# Patient Record
Sex: FEMALE | Race: WHITE | NOT HISPANIC OR LATINO | Employment: UNEMPLOYED | URBAN - METROPOLITAN AREA
[De-identification: names, ages, dates, MRNs, and addresses within clinical notes are randomized per-mention and may not be internally consistent; named-entity substitution may affect disease eponyms.]

---

## 2017-04-05 ENCOUNTER — OFFICE VISIT - HEALTHEAST (OUTPATIENT)
Dept: INTERNAL MEDICINE | Facility: CLINIC | Age: 22
End: 2017-04-05

## 2017-04-05 DIAGNOSIS — Z30.9 CONTRACEPTION MANAGEMENT: ICD-10-CM

## 2017-04-05 DIAGNOSIS — Z11.3 ROUTINE SCREENING FOR STI (SEXUALLY TRANSMITTED INFECTION): ICD-10-CM

## 2017-04-05 DIAGNOSIS — L70.9 ACNE: ICD-10-CM

## 2018-05-31 ENCOUNTER — COMMUNICATION - HEALTHEAST (OUTPATIENT)
Dept: INTERNAL MEDICINE | Facility: CLINIC | Age: 23
End: 2018-05-31

## 2019-08-01 ENCOUNTER — AMBULATORY - HEALTHEAST (OUTPATIENT)
Dept: MULTI SPECIALTY CLINIC | Facility: CLINIC | Age: 24
End: 2019-08-01

## 2019-08-01 LAB — PAP SMEAR - HIM PATIENT REPORTED: NORMAL

## 2019-08-21 ENCOUNTER — OFFICE VISIT - HEALTHEAST (OUTPATIENT)
Dept: FAMILY MEDICINE | Facility: CLINIC | Age: 24
End: 2019-08-21

## 2019-08-21 DIAGNOSIS — Z71.84 TRAVEL ADVICE ENCOUNTER: ICD-10-CM

## 2019-08-21 DIAGNOSIS — A09 TRAVELER'S DIARRHEA: ICD-10-CM

## 2020-01-16 ENCOUNTER — AMBULATORY - HEALTHEAST (OUTPATIENT)
Dept: INTERNAL MEDICINE | Facility: CLINIC | Age: 25
End: 2020-01-16

## 2020-11-24 ENCOUNTER — COMMUNICATION - HEALTHEAST (OUTPATIENT)
Dept: INTERNAL MEDICINE | Facility: CLINIC | Age: 25
End: 2020-11-24

## 2020-11-25 ENCOUNTER — OFFICE VISIT - HEALTHEAST (OUTPATIENT)
Dept: INTERNAL MEDICINE | Facility: CLINIC | Age: 25
End: 2020-11-25

## 2020-11-25 DIAGNOSIS — Z30.09 GENERAL COUNSELING FOR PRESCRIPTION OF ORAL CONTRACEPTIVES: ICD-10-CM

## 2020-11-25 DIAGNOSIS — F41.1 GAD (GENERALIZED ANXIETY DISORDER): ICD-10-CM

## 2020-11-25 ASSESSMENT — ANXIETY QUESTIONNAIRES
4. TROUBLE RELAXING: MORE THAN HALF THE DAYS
5. BEING SO RESTLESS THAT IT IS HARD TO SIT STILL: SEVERAL DAYS
2. NOT BEING ABLE TO STOP OR CONTROL WORRYING: SEVERAL DAYS
IF YOU CHECKED OFF ANY PROBLEMS ON THIS QUESTIONNAIRE, HOW DIFFICULT HAVE THESE PROBLEMS MADE IT FOR YOU TO DO YOUR WORK, TAKE CARE OF THINGS AT HOME, OR GET ALONG WITH OTHER PEOPLE: VERY DIFFICULT
GAD7 TOTAL SCORE: 10
6. BECOMING EASILY ANNOYED OR IRRITABLE: MORE THAN HALF THE DAYS
3. WORRYING TOO MUCH ABOUT DIFFERENT THINGS: SEVERAL DAYS
7. FEELING AFRAID AS IF SOMETHING AWFUL MIGHT HAPPEN: SEVERAL DAYS
1. FEELING NERVOUS, ANXIOUS, OR ON EDGE: MORE THAN HALF THE DAYS

## 2020-11-25 ASSESSMENT — PATIENT HEALTH QUESTIONNAIRE - PHQ9: SUM OF ALL RESPONSES TO PHQ QUESTIONS 1-9: 13

## 2021-05-26 ASSESSMENT — PATIENT HEALTH QUESTIONNAIRE - PHQ9: SUM OF ALL RESPONSES TO PHQ QUESTIONS 1-9: 13

## 2021-05-27 ENCOUNTER — COMMUNICATION - HEALTHEAST (OUTPATIENT)
Dept: INTERNAL MEDICINE | Facility: CLINIC | Age: 26
End: 2021-05-27

## 2021-05-27 DIAGNOSIS — F41.1 GAD (GENERALIZED ANXIETY DISORDER): ICD-10-CM

## 2021-05-28 ASSESSMENT — ANXIETY QUESTIONNAIRES: GAD7 TOTAL SCORE: 10

## 2021-05-30 VITALS — WEIGHT: 144.6 LBS | BODY MASS INDEX: 21.83 KG/M2

## 2021-05-31 NOTE — PROGRESS NOTES
HPI:  Monica Arauz is a 23 y.o. 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  She will be staying in [place].  Review of CDC requirements for vaccinations shows that she needs a typhoid vaccine.  She also could do a Turkish encephalitis vaccine but will need to go to the [facility] for that.  Also she will be in [place] most of the time but is planning a 2 or 3 weekend trips to more [place].  She will not be doing any cave exploring or going into any deep jungle type areas.  She will not be exposed to any wild animals.  Patient denies chest pain, palpitations, shortness of breath, wheezing, cough, fever, dysuria, rash, abdominal pain, nausea, vomiting, diarrhea, constipation.  No results found for: HGBA1C  No results found for: GLUF, MICROALBUR, LDLCALC, CREATININE  There is no problem list on file for this patient.    Family History   Problem Relation Age of Onset     Breast cancer Paternal Grandmother      Pancreatic cancer Paternal Grandfather      Social History     Socioeconomic History     Marital status: Single     Spouse name: Not on file     Number of children: Not on file     Years of education: Not on file     Highest education level: Not on file   Occupational History     Occupation: student   Social Needs     Financial resource strain: Not on file     Food insecurity:     Worry: Not on file     Inability: Not on file     Transportation needs:     Medical: Not on file     Non-medical: Not on file   Tobacco Use     Smoking status: Never Smoker   Substance and Sexual Activity     Alcohol use: Yes     Alcohol/week: 3.0 oz     Types: 5 Standard drinks or equivalent per week     Drug use: No     Sexual activity: Not Currently     Partners: Male     Birth control/protection: Pill   Lifestyle     Physical activity:     Days per week: Not on file     Minutes  per session: Not on file     Stress: Not on file   Relationships     Social connections:     Talks on phone: Not on file     Gets together: Not on file     Attends Lutheran service: Not on file     Active member of club or organization: Not on file     Attends meetings of clubs or organizations: Not on file     Relationship status: Not on file     Intimate partner violence:     Fear of current or ex partner: Not on file     Emotionally abused: Not on file     Physically abused: Not on file     Forced sexual activity: Not on file   Other Topics Concern     Not on file   Social History Narrative     Not on file     Past Surgical History:   Procedure Laterality Date     WISDOM TOOTH EXTRACTION       Current Outpatient Medications on File Prior to Visit   Medication Sig Dispense Refill     cholecalciferol, vitamin D3, 2,000 unit Tab Take 3 tablets by mouth.       norgestimate-ethinyl estradiol (TRINESSA, 28,) 0.18/0.215/0.25 mg-35 mcg (28) Tab tablet Take 1 tablet by mouth daily. 84 tablet 4     tretinoin (RETIN-A) 0.025 % cream Apply topically at bedtime. 45 g 3     No current facility-administered medications on file prior to visit.      No Known Allergies  OB History        0    Para   0    Term   0       0    AB   0    Living   0       SAB   0    TAB   0    Ectopic   0    Multiple   0    Live Births                   I have reviewed the patient's medical history in detail and updated the computerized patient record.  OBJECTIVE:  Wt Readings from Last 3 Encounters:   19 149 lb 1.6 oz (67.6 kg)   17 144 lb 9.6 oz (65.6 kg)   16 137 lb (62.1 kg)     Temp Readings from Last 3 Encounters:   No data found for Temp     BP Readings from Last 3 Encounters:   19 114/70   17 100/74   16 110/62     Pulse Readings from Last 3 Encounters:   19 70   17 80   16 74     Body mass index is 22.5 kg/m .     Alert, cooperative, well-hydrated. Appears well.  Eyes: Pupils  equal, round, reactive to light.  HEENT: Sclera white, nares patent, MMM, TM's pearly bilaterally  Neck: supple, without lymphadenopathy, Thyroid freely movable and without hypotrophy or nodularity.   Lungs: Clear to auscultation. No retractions, no increased work of respiration, equal chest rise.   Heart: Regular rate and rhythm, no murmurs, clicks,   Gallops.  Abdomen: Soft, bowel sounds in 4 quadrants with no tenderness to palpation, no organomegaly or masses, no aortic or renal bruits.  Extremities: no tenderness to palpation of gastrocnemius, bilaterally.  Skin: no increased warmth, edema, or erythema of lower legs bilaterally.    Labs:  No visits with results within 3 Month(s) from this visit.   Latest known visit with results is:   Office Visit on 04/05/2017   Component Date Value     Chlamydia trachomatis, A* 04/05/2017 Negative      Neisseria gonorrhoeae, A* 04/05/2017 Negative      ASSESMENT/PLAN:  1. Travel advice encounter  Typhoid, Inactive, Inj   2. Traveler's diarrhea  ciprofloxacin HCl (CIPRO) 500 MG tablet   Discussed malaria prophylaxis and since she is going to be mostly in city areas and only taking short weekend trips outside of [facility], advised that short courses of malaria prophylaxis would not be very beneficial, she will use mosquito nets and DEET.  Discussed prophylactic for traveler's diarrhea, Pepto-Bismol, Cipro if bloody diarrhea.  Use bottled water even for brushing teeth, eat from restaurants in your motel.  Follow-up in 1 year for physical.  Latoya Richards, MS, PA-C 08/26/19

## 2021-06-03 ENCOUNTER — RECORDS - HEALTHEAST (OUTPATIENT)
Dept: ADMINISTRATIVE | Facility: CLINIC | Age: 26
End: 2021-06-03

## 2021-06-03 VITALS — WEIGHT: 149.1 LBS | BODY MASS INDEX: 22.5 KG/M2

## 2021-06-09 NOTE — PROGRESS NOTES
Internal Medicine Office Visit  Patient Name: Monica Arauz  Patient Age: 21 y.o.  YOB: 1995  MRN: [id number]  ?  Date of Visit: 2017  Reason for Office Visit:   Chief Complaint   Patient presents with     Follow-up     Med check       Assessment / Plan / Medical Decision Makin. Routine screening for STI (sexually transmitted infection)  2. Acne  3. Contraception management  -STI check today.  Continue current oral contraceptive pills.  STI prevention discussed today.  Will continue tretinoin cream for acne as this has been quite effective for her.  PAP test was done at her [facility] in , repeat in . Follow-up in 1 year.      Health Maintenance Review  Health Maintenance   Topic Date Due     ADVANCE DIRECTIVES DISCUSSED WITH PATIENT  2011     PAP SMEAR  2014     INFLUENZA VACCINE RULE BASED (1) 2016     CHLAMYDIA SCREENING  2018     TD 18+ HE  2026     HPV VACCINES  Completed     TDAP ADULT ONE TIME DOSE  Completed           I have changed Ms. Arauz's tretinoin. I am also having her maintain her cholecalciferol (vitamin D3) and norgestimate-ethinyl estradiol.     HPI:   Encounter Diagnoses   Name Primary?     Routine screening for STI (sexually transmitted infection) Yes     Acne      Contraception management       The patient is a 21-year-old female who presents to the office today for medication check.  She needs refills of her oral contraceptive pills.  She has been working abroad in [place] and will be returning later this weekend.  Thereafter, she intends to obtain a job in [place].  She is sexually active and has been happy with this method of contraception.  She denies any symptoms of STDs.    We reviewed a history of acne vulgaris.  She has been using tretinoin cream.  This is been very effective for her.    Review of Systems: No symptoms of STI.    Current Scheduled Meds:  Outpatient Encounter Prescriptions as of 2017    Medication Sig Dispense Refill     cholecalciferol, vitamin D3, 2,000 unit Tab Take 3 tablets by mouth.       norgestimate-ethinyl estradiol (TRINESSA, 28,) 0.18/0.215/0.25 mg-35 mcg (28) Tab tablet Take 1 tablet by mouth daily. 84 tablet 4     tretinoin (RETIN-A) 0.025 % cream Apply topically at bedtime. 45 g 3     [DISCONTINUED] norgestimate-ethinyl estradiol (TRINESSA, 28,) 0.18/0.215/0.25 mg-35 mcg (28) Tab tablet Take 1 tablet by mouth daily. 84 tablet 3     [DISCONTINUED] tretinoin (RETIN-A) 0.025 % cream Apply topically bedtime.       No facility-administered encounter medications on file as of 4/5/2017.      History reviewed. No pertinent past medical history.  Past Surgical History:   Procedure Laterality Date     WISDOM TOOTH EXTRACTION       Social History   Substance Use Topics     Smoking status: Never Smoker     Smokeless tobacco: None     Alcohol use 3.0 oz/week     5 Standard drinks or equivalent per week       Objective / Physical Examination:  Vitals:    04/05/17 1345   BP: 100/74   Patient Site: Left Arm   Patient Position: Sitting   Cuff Size: Adult Regular   Pulse: 80   Weight: 144 lb 9.6 oz (65.6 kg)     Wt Readings from Last 3 Encounters:   04/05/17 144 lb 9.6 oz (65.6 kg)   04/22/16 137 lb (62.1 kg)     Body mass index is 21.83 kg/(m^2).    General Appearance: Alert and oriented, cooperative, affect appropriate, speech clear, in no apparent distress  Integumentary: Skin is clear, no acne lesions on the face.    Orders Placed This Encounter   Procedures     Chlamydia trachomatis & Neisseria gonorrhoeae, Amplified Detection   Followup: Return in about 1 year (around 4/5/2018) for Annual physical. earlier if needed.      Pavithra Luis, CNP  [facility]

## 2021-06-10 ENCOUNTER — RECORDS - HEALTHEAST (OUTPATIENT)
Dept: ADMINISTRATIVE | Facility: OTHER | Age: 26
End: 2021-06-10

## 2021-06-10 ENCOUNTER — OFFICE VISIT - HEALTHEAST (OUTPATIENT)
Dept: INTERNAL MEDICINE | Facility: CLINIC | Age: 26
End: 2021-06-10

## 2021-06-10 DIAGNOSIS — Z30.09 GENERAL COUNSELING FOR PRESCRIPTION OF ORAL CONTRACEPTIVES: ICD-10-CM

## 2021-06-10 DIAGNOSIS — E55.9 VITAMIN D DEFICIENCY: ICD-10-CM

## 2021-06-10 DIAGNOSIS — Z00.00 ANNUAL PHYSICAL EXAM: ICD-10-CM

## 2021-06-10 DIAGNOSIS — R42 DIZZINESS: ICD-10-CM

## 2021-06-10 DIAGNOSIS — F41.1 GAD (GENERALIZED ANXIETY DISORDER): ICD-10-CM

## 2021-06-10 LAB
CHOLEST SERPL-MCNC: 180 MG/DL
ERYTHROCYTE [DISTWIDTH] IN BLOOD BY AUTOMATED COUNT: 13 % (ref 11–14.5)
FASTING STATUS PATIENT QL REPORTED: NORMAL
FASTING STATUS PATIENT QL REPORTED: NORMAL
GLUCOSE BLD-MCNC: 87 MG/DL (ref 70–125)
HCT VFR BLD AUTO: 40.7 % (ref 35–47)
HDLC SERPL-MCNC: 58 MG/DL
HGB BLD-MCNC: 13.5 G/DL (ref 12–16)
LDLC SERPL CALC-MCNC: 102 MG/DL
MCH RBC QN AUTO: 28.4 PG (ref 27–34)
MCHC RBC AUTO-ENTMCNC: 33.2 G/DL (ref 32–36)
MCV RBC AUTO: 86 FL (ref 80–100)
PLATELET # BLD AUTO: 319 THOU/UL (ref 140–440)
PMV BLD AUTO: 9.9 FL (ref 7–10)
RBC # BLD AUTO: 4.75 MILL/UL (ref 3.8–5.4)
TRIGL SERPL-MCNC: 102 MG/DL
TSH SERPL DL<=0.005 MIU/L-ACNC: 3.58 UIU/ML (ref 0.3–5)
WBC: 5.5 THOU/UL (ref 4–11)

## 2021-06-10 RX ORDER — CITALOPRAM HYDROBROMIDE 20 MG/1
20 TABLET ORAL DAILY
Qty: 90 TABLET | Refills: 1 | Status: SHIPPED | OUTPATIENT
Start: 2021-06-10 | End: 2021-11-22

## 2021-06-10 RX ORDER — LORATADINE 10 MG/1
10 TABLET ORAL DAILY
Status: SHIPPED | COMMUNITY
Start: 2021-06-10

## 2021-06-10 RX ORDER — QUINIDINE SULFATE 200 MG
TABLET ORAL
Status: SHIPPED | COMMUNITY
Start: 2021-06-10

## 2021-06-10 RX ORDER — NORGESTIMATE AND ETHINYL ESTRADIOL 0.25-0.035
1 KIT ORAL DAILY
Qty: 84 TABLET | Refills: 4 | Status: SHIPPED | OUTPATIENT
Start: 2021-06-10 | End: 2021-12-01

## 2021-06-10 ASSESSMENT — PATIENT HEALTH QUESTIONNAIRE - PHQ9: SUM OF ALL RESPONSES TO PHQ QUESTIONS 1-9: 3

## 2021-06-10 ASSESSMENT — MIFFLIN-ST. JEOR: SCORE: 1463.9

## 2021-06-11 LAB — 25(OH)D3 SERPL-MCNC: 44 NG/ML (ref 30–80)

## 2021-06-13 NOTE — PROGRESS NOTES
"Monica Arauz is a 25 y.o. female who is being evaluated via a billable telephone visit.      The patient has been notified of following:     \"This telephone visit will be conducted via a call between you and your physician/provider. We have found that certain health care needs can be provided without the need for a physical exam.  This service lets us provide the care you need with a short phone conversation.  If a prescription is necessary we can send it directly to your pharmacy.  If lab work is needed we can place an order for that and you can then stop by our lab to have the test done at a later time.    Telephone visits are billed at different rates depending on your insurance coverage. During this emergency period, for some insurers they may be billed the same as an in-person visit.  Please reach out to your insurance provider with any questions.    If during the course of the call the physician/provider feels a telephone visit is not appropriate, you will not be charged for this service.\"    Patient has given verbal consent to a Telephone visit? Yes    What phone number would you like to be contacted at? [phone]     Patient would like to receive their AVS by AVS Preference: Jose.    Internal Medicine Office Visit- TELEPHONE VISIT  [facility]   Patient Name: Monica Arauz  Patient Age: 25 y.o.  YOB: 1995  MRN: [id number]    Date of Visit: 2020  Reason for Telephone Visit:   Chief Complaint   Patient presents with     Medication Management     citalopram       Assessment / Plan / Medical Decision Makin. RUCHI (generalized anxiety disorder)  - CONTINUE: citalopram (CELEXA) 20 MG tablet; Take 1 tablet (20 mg total) by mouth daily.  Dispense: 90 tablet; Refill: 1  - she continues to see a therapist regularly that she has known since age 14     2. General counseling for prescription of oral contraceptives  - Pap is UTD, will get record from [facility] " of pap done August 2019  - norgestimate-ethinyl estradioL (SPRINTEC, 28,) 0.25-35 mg-mcg per tablet; Take 1 tablet by mouth daily.  Dispense: 84 tablet; Refill: 3      Telephone visit duration: 7 minutes     Health Maintenance Review  Health Maintenance   Topic Date Due     ADVANCE CARE PLANNING  11/06/2011     PREVENTIVE CARE VISIT  04/22/2017     PAP SMEAR  08/01/2022     TD 18+ HE  04/22/2026     DEPRESSION ACTION PLAN  Completed     HEPATITIS B VACCINES  Completed     INFLUENZA VACCINE RULE BASED  Completed     TDAP ADULT ONE TIME DOSE  Completed     Pneumococcal Vaccine: Pediatrics (0 to 5 Years) and At-Risk Patients (6 to 64 Years)  Aged Out     HEPATITIS C SCREENING  Discontinued     HIV SCREENING  Discontinued         I have changed Monica Arauz's Sprintec (28) to norgestimate-ethinyl estradioL. I am also having her start on citalopram.      HPI:  Monica Arauz is 25 y.o. year old and was contacted today for a telephone visit. She was living in [place] over the past year and started on citalopram [place] living [place] for anxiety. This has been very effective, she wishes to continue the medication. She is seeing a psychologist.     She recently restarted her OCP and asks about refills of this as well. She is UTD with PAP smears, her last was done through [facility] but the report isn't scanned into her chart.     Review of Systems:  Negative for thoughts of suicide or self harm       Current Scheduled Meds:  Outpatient Encounter Medications as of 11/25/2020   Medication Sig Dispense Refill     norgestimate-ethinyl estradioL (SPRINTEC, 28,) 0.25-35 mg-mcg per tablet Take 1 tablet by mouth daily. 84 tablet 3     [DISCONTINUED] SPRINTEC, 28, 0.25-35 mg-mcg per tablet Take 1 tablet by mouth daily.       citalopram (CELEXA) 20 MG tablet Take 1 tablet (20 mg total) by mouth daily. 90 tablet 1     No facility-administered encounter medications on file as of 11/25/2020.          History reviewed. No pertinent  past medical history.  Past Surgical History:   Procedure Laterality Date     WISDOM TOOTH EXTRACTION       Social History     Tobacco Use     Smoking status: Never Smoker   Substance Use Topics     Alcohol use: Yes     Alcohol/week: 5.0 standard drinks     Types: 5 Standard drinks or equivalent per week     Drug use: No       Objective / Physical Examination:  PHQ-9 Total Score: 13 (11/25/2020 10:00 AM)  RUCHI-7 Total: 10 (11/25/2020 10:00 AM)    Insight is good. Thought process is logical. Patient is speaking in full sentences.     No orders of the defined types were placed in this encounter.  Followup: Return in about 6 months (around 5/25/2021). earlier if needed.

## 2021-06-16 PROBLEM — F41.1 GAD (GENERALIZED ANXIETY DISORDER): Status: ACTIVE | Noted: 2021-06-10

## 2021-06-25 NOTE — TELEPHONE ENCOUNTER
Refill Approved    Rx renewed per Medication Renewal Policy. Medication was last renewed on 11/25/20.    Michoacano Cavanaugh, [facility] Connection Triage/Med Refill 5/28/2021     Requested Prescriptions   Pending Prescriptions Disp Refills     citalopram (CELEXA) 20 MG tablet 90 tablet 1     Sig: Take 1 tablet (20 mg total) by mouth daily.       SSRI Refill Protocol  Passed - 5/27/2021  3:16 PM        Passed - PCP or prescribing provider visit in last year     Last office visit with prescriber/PCP: Visit date not found OR same dept: Visit date not found OR same specialty: Visit date not found  Last physical: Visit date not found Last MTM visit: Visit date not found   Next visit within 3 mo: Visit date not found  Next physical within 3 mo: Visit date not found  Prescriber OR PCP: PIETER Reese  Last diagnosis associated with med order: 1. RUCHI (generalized anxiety disorder)  - citalopram (CELEXA) 20 MG tablet; Take 1 tablet (20 mg total) by mouth daily.  Dispense: 90 tablet; Refill: 1    If protocol passes may refill for 12 months if within 3 months of last provider visit (or a total of 15 months).

## 2021-06-26 NOTE — PROGRESS NOTES
Assessment/Plan:       Problem List Items Addressed This Visit     RUCHI (generalized anxiety disorder)    Relevant Medications    citalopram (CELEXA) 20 MG tablet      Other Visit Diagnoses     Annual physical exam    -  Primary    Relevant Orders    Glucose (Completed)    Lipid Rosie FASTING (Completed)    Dizziness        possibly BPPV? We will check a CBC, TSH today. She will keep track of when symptoms occur. Referral to PT/OT    Relevant Orders    HM2(CBC w/o Differential) (Completed)    Thyroid Stimulating Hormone (TSH) (Completed)    Ambulatory referral to PT/OT    General counseling for prescription of oral contraceptives        Relevant Medications    norgestimate-ethinyl estradioL (SPRINTEC, 28,) 0.25-35 mg-mcg per tablet    Vitamin D deficiency        Relevant Orders    Vitamin D, Total (25-Hydroxy) (Completed)         - Reviewed the importance of monitoring for changes in breast appearance such as nipple inversion, changes in breast tissue texture, non-healing wounds, or nipple discharge   - Plant-based diet and 150 minutes of physical activity per week recommended   - Health screenings and vaccines appropriate for age, biological gender, and risk factors reviewed and ordered as per this discussion           Subjective:     Monica Arauz is a 25 y.o. female who presents for an annual exam.     Over the past 2 year she has been dealing with vertigo described as feeling dizzy and lightheaded. Once this happened when she hiked to the top of a mountain and had to rest for 45 minutes. She spins, gets shaky and lightheaded. Allergies seem to trigger the allergies. Being in motion such as on a train or a car. Allergy medications seem to help it, meclizine also helps. Sugary beverages also seem to help.    RUCHI is doing well.  She is moving to [place] later this summer.     The patient reports that there is not domestic violence in her life.     Healthy Habits:   Regular Exercise: Yes, idalia   Sunscreen Use:  Yes  Healthy Diet: Yes  Dental Visits Regularly: No, 2.5 years ago   Sexually active: No, not currently     Health Maintenance   Topic Date Due     ADVANCE CARE PLANNING  Never done     PREVENTIVE CARE VISIT  2017     PAP SMEAR  2022     TD 18+ HE  2026     HEPATITIS B VACCINES  Completed     INFLUENZA VACCINE RULE BASED  Completed     TDAP ADULT ONE TIME DOSE  Completed     COVID-19 Vaccine  Completed     Pneumococcal Vaccine: Pediatrics (0 to 5 Years) and At-Risk Patients (6 to 64 Years)  Aged Out     HEPATITIS C SCREENING  Discontinued     HIV SCREENING  Discontinued         Immunization History   Administered Date(s) Administered     COVID-19,PF,Pfizer 2021, 2021     DTaP, historic 1994, 1994, 1994, 1995, 1998     HPV Quadrivalent 2008, 2009, 2010     Hep A, Adult IM (19yr & older) 2008, 2009     Hep A, historic 2008, 2009     Hep B, historic 1993, 1994, 1994     HiB, historic,unspecified 1994, 1994, 1994, 1995     IPV 1994, 1994, 1994, 1998     Influenza, inj, historic,unspecified 2010, 2019     Influenza,seasonal, Inj IIV3 2010     Influenza,seasonal,quad inj =/> 6months 2017     MMR 1995, 1998     Meningococcal MCV4P 2008     Tdap 2006, 2016     Typhoid, Inj, Inactive 2019     Varicella 1996, 2008       Gynecologic History  Patient's last menstrual period was 2021 (exact date).  Contraception: OCP (estrogen/progesterone)  Last Pap: 19. Results were: normal     OB History    Para Term  AB Living   0 0 0 0 0 0   SAB TAB Ectopic Multiple Live Births   0 0 0 0         Current Outpatient Medications   Medication Sig Dispense Refill     citalopram (CELEXA) 20 MG tablet Take 1 tablet (20 mg total) by mouth daily. 90 tablet 1     collagen, hydrolysate,  bovine, (COLLAGEN, HYDR, BOVINE,, BULK,) 100 % Powd Use As Directed.       loratadine (CLARITIN) 10 mg tablet Take 10 mg by mouth daily.       mv-mn/iron/folic acid/herb 190 (VITAMIN D3 COMPLETE ORAL) Take by mouth.       norgestimate-ethinyl estradioL (SPRINTEC, 28,) 0.25-35 mg-mcg per tablet Take 1 tablet by mouth daily. 84 tablet 4     No current facility-administered medications for this visit.      No past medical history on file.  Past Surgical History:   Procedure Laterality Date     WISDOM TOOTH EXTRACTION       Patient has no known allergies.  Family History   Problem Relation Age of Onset     Breast cancer Paternal Grandmother      Pancreatic cancer Paternal Grandfather      Social History     Socioeconomic History     Marital status: Single     Spouse name: Not on file     Number of children: Not on file     Years of education: Not on file     Highest education level: Not on file   Occupational History     Occupation: student   Social Needs     Financial resource strain: Not on file     Food insecurity     Worry: Not on file     Inability: Not on file     Transportation needs     Medical: Not on file     Non-medical: Not on file   Tobacco Use     Smoking status: Never Smoker     Smokeless tobacco: Never Used   Substance and Sexual Activity     Alcohol use: Yes     Alcohol/week: 6.0 standard drinks     Types: 6 Standard drinks or equivalent per week     Drug use: No     Sexual activity: Not Currently     Partners: Male     Birth control/protection: Pill   Lifestyle     Physical activity     Days per week: Not on file     Minutes per session: Not on file     Stress: Not on file   Relationships     Social connections     Talks on phone: Not on file     Gets together: Not on file     Attends Sikh service: Not on file     Active member of club or organization: Not on file     Attends meetings of clubs or organizations: Not on file     Relationship status: Not on file     Intimate partner violence     Fear  "of current or ex partner: Not on file     Emotionally abused: Not on file     Physically abused: Not on file     Forced sexual activity: Not on file   Other Topics Concern     Not on file   Social History Narrative     Not on file         Objective:      Vitals:    06/10/21 0655   BP: 114/60   Patient Site: Right Arm   Patient Position: Sitting   Pulse: 75   Temp: 97.5  F (36.4  C)   SpO2: 99%   Weight: 150 lb (68 kg)   Height: 5' 8\" (1.727 m)     Wt Readings from Last 3 Encounters:   06/10/21 150 lb (68 kg)   08/21/19 149 lb 1.6 oz (67.6 kg)   04/05/17 144 lb 9.6 oz (65.6 kg)     Body mass index is 22.81 kg/m .     Physical Exam:  General Appearance: Alert, cooperative, no distress.  Eyes: EOM's intact  Ears: Normal TM's and external ear canals, both ears  Throat: Lips, mucosa, and tongue normal  Neck: Supple, symmetrical, trachea midline, no adenopathy;  thyroid: not enlarged, symmetric, no tenderness/mass/nodules  Lungs: Clear to auscultation bilaterally, respirations unlabored  Breasts: No breast masses, tenderness, asymmetry, or nipple discharge.  Heart: Regular rate and rhythm, S1 and S2 normal, no murmur, rub, or gallop  Abdomen: Soft, non-tender, bowel sounds active all four quadrants,  no masses, no organomegaly  Extremities: Extremities normal, atraumatic, no cyanosis or edema  Skin: Skin color, texture, turgor normal, no rashes or lesions  Lymph nodes: Cervical, supraclavicular, and axillary nodes normal  Neurologic: Normal. No nystagmus   Psych: Normal affect.  Does not appear anxious or depressed.         "

## 2021-07-06 VITALS
BODY MASS INDEX: 22.73 KG/M2 | WEIGHT: 150 LBS | OXYGEN SATURATION: 99 % | HEIGHT: 68 IN | SYSTOLIC BLOOD PRESSURE: 114 MMHG | DIASTOLIC BLOOD PRESSURE: 60 MMHG | HEART RATE: 75 BPM | TEMPERATURE: 97.5 F

## 2021-07-06 ASSESSMENT — PATIENT HEALTH QUESTIONNAIRE - PHQ9: SUM OF ALL RESPONSES TO PHQ QUESTIONS 1-9: 3

## 2021-07-16 ENCOUNTER — HOSPITAL ENCOUNTER (OUTPATIENT)
Dept: OCCUPATIONAL THERAPY | Facility: REHABILITATION | Age: 26
End: 2021-07-16
Payer: COMMERCIAL

## 2021-07-16 DIAGNOSIS — R26.81 UNSTEADINESS ON FEET: ICD-10-CM

## 2021-07-16 DIAGNOSIS — R42 DIZZINESS: Primary | ICD-10-CM

## 2021-07-16 DIAGNOSIS — Z78.9 DECREASED ACTIVITIES OF DAILY LIVING (ADL): ICD-10-CM

## 2021-07-16 PROCEDURE — 97166 OT EVAL MOD COMPLEX 45 MIN: CPT | Mod: GO | Performed by: OCCUPATIONAL THERAPIST

## 2021-07-16 NOTE — PROGRESS NOTES
07/16/21 0700   Quick Adds   Quick Adds Vestibular Eval   Type of Visit Outpatient Occupational Therapy Re-Evaluation   General Information   Start Of Care Date 07/16/21   Referring Physician Pavithra Luis CNP   Orders Evaluate and treat as indicated   Orders Date 06/10/21   Medical Diagnosis dizziness   Onset of Illness/Injury or Date of Surgery 06/16/16   Precautions/Limitations No known precautions/limitations   Surgical/Medical History Reviewed Yes   Additional Occupational Profile Info/Pertinent History of Current Problem Patient here for evaluation of vertigo and nausea. She had the initial episode in June 2016 when she had nausea, vertigo and passed out. Symptoms lasted 2-3 days and then resolved. Since then, she reports that she has 2-3 episodes per year that last 2-3 days. Most recent episode was in April 2021. She has no history of Migraine and no history of otologic problems.   Pain   Patient currently in pain No   Fall Risk Screen   Fall screen completed by OT   Have you fallen 2 or more times in the past year? No   Have you fallen and had an injury in the past year? No   Is patient a fall risk? No   Abuse Screen (yes response referral indicated)   Feels Unsafe at Home or Work/School no   Feels Threatened by Someone no   Does Anyone Try to Keep You From Having Contact with Others or Doing Things Outside Your Home? no   Physical Signs of Abuse Present no   Oculomotor Exam   Smooth Pursuit Normal   Smooth Pursuit Comment dizziness   Oculomotor Exam Abnormal   Saccades Comments undershoots with eyes right. dizziness with vertical saccades   Infrared Goggle Exam or Frenzel Lense Exam   Vestibular Suppressant in Last 24 Hours? No   Exam completed with Infrared Goggles   Spontaneous Nystagmus Other  (unable to hold a steady gaze)   Spontaneous Nystagmus comments right eye deviates medially with fixation light   Gaze Evoked Nystagmus Other  (eyes wiggle right and left)   Head Shake Horizontal Nystagmus  Horizontal R   Head Shake Horizontal Nystagmus Comments mild dizziness   [place]-Hallpike (right) Negative   Marcela-Hallpike (Left) Negative    OT Goal 1   Goal Identifier 1.   Goal Description Patient will follow through with ENT consult in [place] to determine cause of dizziness   Target Date 08/15/21   Clinical Impression   OT Diagnosis dizziness, unsteadiness   Clinical Decision Making (Complexity) Moderate complexity   Risks and Benefits of Treatment have been explained. Yes   Patient, Family & other staff in agreement with plan of care Yes   Clinical Impression Comments Patient has both central and peripheral vestibular signs on evaluation today. She would benefit from a consult with neuro-otologist and further work up for dizziness due to central signs present on evaluation today. Patient is moving to [place] in 2 weeks but has lived there previously and has a PCP there. She will connect with [place] PCP and initiate the process for referral to neuro-otology.   Total Evaluation Time   OT Eval, Moderate Complexity Minutes (01566) 30

## 2021-07-16 NOTE — ADDENDUM NOTE
Encounter addended by: Linda Sanders, OT on: 7/16/2021 10:37 AM   Actions taken: Order list changed, Diagnosis association updated

## 2021-07-21 DIAGNOSIS — R42 DIZZINESS: Primary | ICD-10-CM

## 2021-07-26 NOTE — ADDENDUM NOTE
Encounter addended by: Linda Sanders, OT on: 7/26/2021 7:27 AM   Actions taken: Charge Capture section accepted

## 2021-10-17 ENCOUNTER — HEALTH MAINTENANCE LETTER (OUTPATIENT)
Age: 26
End: 2021-10-17

## 2021-10-30 ENCOUNTER — E-VISIT (OUTPATIENT)
Dept: INTERNAL MEDICINE | Facility: CLINIC | Age: 26
End: 2021-10-30
Payer: COMMERCIAL

## 2021-10-30 DIAGNOSIS — F41.1 GAD (GENERALIZED ANXIETY DISORDER): Primary | ICD-10-CM

## 2021-10-30 PROCEDURE — 99207 PR NON-BILLABLE SERV PER CHARTING: CPT | Performed by: NURSE PRACTITIONER

## 2021-11-01 ENCOUNTER — TELEPHONE (OUTPATIENT)
Dept: INTERNAL MEDICINE | Facility: CLINIC | Age: 26
End: 2021-11-01

## 2021-11-01 NOTE — TELEPHONE ENCOUNTER
Call patient: I received an E-visit for her to get refills on her citalopram and birth control medication.  The birth control I refilled in June for a year supply.  She needs this sent somewhere else?    For the citalopram, she will be due for a 6-month follow-up appointment in December.  Please schedule this, a virtual visit is acceptable for this.    E-visit cancelled.

## 2021-11-01 NOTE — TELEPHONE ENCOUNTER
lmtcb please help patient schedule follow up in December and ask about her prescription for birth control.

## 2021-11-05 NOTE — ADDENDUM NOTE
Encounter addended by: Linda Sanders, OT on: 11/4/2021 9:20 PM   Actions taken: Episode resolved, Clinical Note Signed

## 2021-11-05 NOTE — PROGRESS NOTES
Outpatient Occupational Therapy Discharge Note     Patient: Monica Arauz  : 1995    Beginning/End Dates of Reporting Period:  21    Referring Provider: Pavithra Luis CNP    Therapy Diagnosis: Dizziness    Goals: [place] out of [place] and was instructed to work with new provider regarding signs and symptoms identified on evaluation.    Plan:  Discharge from therapy.

## 2021-11-22 ENCOUNTER — VIRTUAL VISIT (OUTPATIENT)
Dept: FAMILY MEDICINE | Facility: CLINIC | Age: 26
End: 2021-11-22
Payer: COMMERCIAL

## 2021-11-22 DIAGNOSIS — F41.1 GAD (GENERALIZED ANXIETY DISORDER): Primary | ICD-10-CM

## 2021-11-22 PROCEDURE — 99203 OFFICE O/P NEW LOW 30 MIN: CPT | Mod: GT | Performed by: FAMILY MEDICINE

## 2021-11-22 RX ORDER — CITALOPRAM HYDROBROMIDE 20 MG/1
20 TABLET ORAL DAILY
Qty: 90 TABLET | Refills: 3 | Status: SHIPPED | OUTPATIENT
Start: 2021-11-22 | End: 2022-07-19

## 2021-11-22 ASSESSMENT — ANXIETY QUESTIONNAIRES
5. BEING SO RESTLESS THAT IT IS HARD TO SIT STILL: MORE THAN HALF THE DAYS
1. FEELING NERVOUS, ANXIOUS, OR ON EDGE: MORE THAN HALF THE DAYS
3. WORRYING TOO MUCH ABOUT DIFFERENT THINGS: MORE THAN HALF THE DAYS
7. FEELING AFRAID AS IF SOMETHING AWFUL MIGHT HAPPEN: NOT AT ALL
IF YOU CHECKED OFF ANY PROBLEMS ON THIS QUESTIONNAIRE, HOW DIFFICULT HAVE THESE PROBLEMS MADE IT FOR YOU TO DO YOUR WORK, TAKE CARE OF THINGS AT HOME, OR GET ALONG WITH OTHER PEOPLE: NOT DIFFICULT AT ALL
6. BECOMING EASILY ANNOYED OR IRRITABLE: MORE THAN HALF THE DAYS
GAD7 TOTAL SCORE: 10
2. NOT BEING ABLE TO STOP OR CONTROL WORRYING: MORE THAN HALF THE DAYS

## 2021-11-22 ASSESSMENT — PATIENT HEALTH QUESTIONNAIRE - PHQ9: 5. POOR APPETITE OR OVEREATING: NOT AT ALL

## 2021-11-22 NOTE — PROGRESS NOTES
Monica is a 26 year old who is being evaluated via a billable video visit.      How would you like to obtain your AVS? MyChart  If the video visit is dropped, the invitation should be resent by: Text to cell phone: [phone]   Will anyone else be joining your video visit? No      Video Start Time: 5:15 PM    Assessment & Plan     RUCHI (generalized anxiety disorder)  RUCHI-7 reviewed   Patient attest to doing well on the current dosage.   Plan:   refill  - citalopram (CELEXA) 20 MG tablet; Take 1 tablet (20 mg) by mouth daily  Follow up 6-12 months                  No follow-ups on file.    Pasha Reid MD  [facility]    Subjective   Monica is a 26 year old who presents for med check and follow-up of anxiety on Celexa 20 mg since May of 2019, reporting to doing well on the current dosage with no intolerance or side effects          Review of Systems         Objective           Vitals:  No vitals were obtained today due to virtual visit.    Physical Exam   GENERAL: Healthy, alert and no distress  PSYCH: Mentation appears normal, affect normal/bright, judgement and insight intact, normal speech and appearance well-groomed.                Video-Visit Details    Type of service:  Video Visit    Video End Time:5:28 PM    Originating Location (pt. Location): Home    Distant Location (provider location):  [facility]     Platform used for Video Visit: [employer]

## 2021-11-23 ASSESSMENT — ANXIETY QUESTIONNAIRES: GAD7 TOTAL SCORE: 10

## 2021-12-01 ENCOUNTER — VIRTUAL VISIT (OUTPATIENT)
Dept: FAMILY MEDICINE | Facility: CLINIC | Age: 26
End: 2021-12-01
Payer: COMMERCIAL

## 2021-12-01 DIAGNOSIS — Z30.09 GENERAL COUNSELING FOR PRESCRIPTION OF ORAL CONTRACEPTIVES: ICD-10-CM

## 2021-12-01 PROCEDURE — 99213 OFFICE O/P EST LOW 20 MIN: CPT | Mod: GT | Performed by: NURSE PRACTITIONER

## 2021-12-01 RX ORDER — NORGESTIMATE AND ETHINYL ESTRADIOL 0.25-0.035
1 KIT ORAL DAILY
Qty: 84 TABLET | Refills: 4 | Status: SHIPPED | OUTPATIENT
Start: 2021-12-01

## 2021-12-01 NOTE — PROGRESS NOTES
Monica is a 26 year old who is being evaluated via a billable video visit.      How would you like to obtain your AVS? MyChart  If the video visit is dropped, the invitation should be resent by: patient already online  Will anyone else be joining your video visit? No    Video Start Time: 5:11 pm    Assessment & Plan     General counseling for prescription of oral contraceptives  No contraindications. Refilled. Use condoms to decrease risk of STI.  - norgestimate-ethinyl estradiol (ORTHO-CYCLEN) 0.25-35 MG-MCG tablet; Take 1 tablet by mouth daily         See Patient Instructions    Return in about 1 year (around 12/1/2022).     The benefits, risks and potential side effects were discussed in detail. Black box warnings discussed as relevant. All patient questions were answered. The patient was instructed to follow up immediately if any adverse reactions develop.    Return precautions discussed, including when to seek urgent/emergent care.    Patient verbalizes understanding and agrees with plan of care. Patient stable for discharge.      HUBERT Gill [facility]    Gabi Anderson is a 26 year old who presents for the following health issues     HPI       The patient is not more than 35 years old. She denies smoking, diabetes, HTN, DVT, headaches with focal neurological symptoms, migraines, known thrombogenic mutations, heart disease, history of stroke, valvular disease, abnormal uterine bleeding, liver disease and personal or family history of breast cancer.     Answers for HPI/ROS submitted by the patient on 12/1/2021  How many servings of fruits and vegetables do you eat daily?: 2-3  On average, how many sweetened beverages do you drink each day (Examples: soda, juice, sweet tea, etc.  Do NOT count diet or artificially sweetened beverages)?: 0  How many minutes a day do you exercise enough to make your heart beat faster?: 30 to 60  How many days a week do you exercise enough  to make your heart beat faster?: 5  How many days per week do you miss taking your medication?: 0        Review of Systems   Constitutional, HEENT, cardiovascular, pulmonary, gi and gu systems are negative, except as otherwise noted.      Objective           Vitals:  No vitals were obtained today due to virtual visit.    Physical Exam   GENERAL: Healthy, alert and no distress  EYES: Eyes grossly normal to inspection.  No discharge or erythema, or obvious scleral/conjunctival abnormalities.  RESP: No audible wheeze, cough, or visible cyanosis.  No visible retractions or increased work of breathing.    SKIN: Visible skin clear. No significant rash, abnormal pigmentation or lesions.  NEURO: Cranial nerves grossly intact.  Mentation and speech appropriate for age.  PSYCH: Mentation appears normal, affect normal/bright, judgement and insight intact, normal speech and appearance well-groomed.                Video-Visit Details    Type of service:  Video Visit    Video End Time:5:16 PM    Originating Location (pt. Location): Home    Distant Location (provider location):  [facility]     Platform used for Video Visit: [employer]

## 2022-03-23 ENCOUNTER — VIRTUAL VISIT (OUTPATIENT)
Dept: FAMILY MEDICINE | Facility: CLINIC | Age: 27
End: 2022-03-23
Payer: COMMERCIAL

## 2022-03-23 DIAGNOSIS — F41.1 GAD (GENERALIZED ANXIETY DISORDER): ICD-10-CM

## 2022-03-23 PROCEDURE — 99214 OFFICE O/P EST MOD 30 MIN: CPT | Mod: GT | Performed by: FAMILY MEDICINE

## 2022-03-23 ASSESSMENT — ANXIETY QUESTIONNAIRES
7. FEELING AFRAID AS IF SOMETHING AWFUL MIGHT HAPPEN: NOT AT ALL
5. BEING SO RESTLESS THAT IT IS HARD TO SIT STILL: NOT AT ALL
3. WORRYING TOO MUCH ABOUT DIFFERENT THINGS: NOT AT ALL
2. NOT BEING ABLE TO STOP OR CONTROL WORRYING: SEVERAL DAYS
1. FEELING NERVOUS, ANXIOUS, OR ON EDGE: MORE THAN HALF THE DAYS
GAD7 TOTAL SCORE: 5
4. TROUBLE RELAXING: SEVERAL DAYS
GAD7 TOTAL SCORE: 5
6. BECOMING EASILY ANNOYED OR IRRITABLE: SEVERAL DAYS
GAD7 TOTAL SCORE: 5
7. FEELING AFRAID AS IF SOMETHING AWFUL MIGHT HAPPEN: NOT AT ALL

## 2022-03-23 ASSESSMENT — PATIENT HEALTH QUESTIONNAIRE - PHQ9
SUM OF ALL RESPONSES TO PHQ QUESTIONS 1-9: 7
10. IF YOU CHECKED OFF ANY PROBLEMS, HOW DIFFICULT HAVE THESE PROBLEMS MADE IT FOR YOU TO DO YOUR WORK, TAKE CARE OF THINGS AT HOME, OR GET ALONG WITH OTHER PEOPLE: SOMEWHAT DIFFICULT
SUM OF ALL RESPONSES TO PHQ QUESTIONS 1-9: 7

## 2022-03-23 NOTE — PROGRESS NOTES
Monica is a 26 year old who is being evaluated via a billable video visit.      How would you like to obtain your AVS? [employer]  If the video visit is dropped, the invitation should be resent by: Text to cell phone: [phone]   Will anyone else be joining your video visit? No      Video Start Time: 10:21 AM    Assessment & Plan     (F41.1) RUCHI (generalized anxiety disorder)  Comment: not optimized with increasing situational stress/ anxiety   discussed dosage increase to 30-40 mg     Plan: she has plenty of the 20mg remaining yet and will start taking 1&1/2 tab ( 30 mg ) daily for 2 weeks and if not feeling the dampening effect on the anxiety yet, will then increase to 40 mg ( taking 2 tabs).    She plans to call/ or reply via [employer] for status update and refills.                    No follow-ups on file.    Pasha Reid MD  [facility]    Subjective   Monica is a 26 year old who presents for med check and follow-up of RUCHI/depression, on citalopram 20 mg daily.  She is also in consultation with her therapist whom seen for the past 10 years, and there are concerns with some recent stress/ anxiety and seasonal depression, which might benefit from a higher dose of this medication. The stress has been ongoing for a few months.       Review of Systems         Objective           Vitals:  No vitals were obtained today due to virtual visit.    Physical Exam   GENERAL: Healthy, alert and no distress  PSYCH: Mentation appears normal, affect normal/bright, judgement and insight intact, normal speech and appearance well-groomed.                Video-Visit Details    Type of service:  Video Visit    Video End Time:10:26 AM    Originating Location (pt. Location): Home    Distant Location (provider location):  [facility]     Platform used for Video Visit: [email or web address]  Answers for HPI/ROS submitted by the patient on 3/23/2022  If you checked off any problems, how difficult have these  problems made it for you to do your work, take care of things at home, or get along with other people?: Somewhat difficult  PHQ9 TOTAL SCORE: 7  RUCHI 7 TOTAL SCORE: 5  Depression/Anxiety: Depression & Anxiety  Status since last visit:: medium  Anxiety since last: : worse  Other associated symptoms of depression:: No  Other associated symotome: : No  Significant life event: : No  Anxious:: Yes  Current substance use:: No  How many servings of fruits and vegetables do you eat daily?: 2-3  On average, how many sweetened beverages do you drink each day (Examples: soda, juice, sweet tea, etc.  Do NOT count diet or artificially sweetened beverages)?: 0  How many minutes a day do you exercise enough to make your heart beat faster?: 30 to 60  How many days a week do you exercise enough to make your heart beat faster?: 6  How many days per week do you miss taking your medication?: 0

## 2022-03-24 ASSESSMENT — PATIENT HEALTH QUESTIONNAIRE - PHQ9: SUM OF ALL RESPONSES TO PHQ QUESTIONS 1-9: 7

## 2022-03-24 ASSESSMENT — ANXIETY QUESTIONNAIRES: GAD7 TOTAL SCORE: 5

## 2022-07-18 DIAGNOSIS — F41.1 GAD (GENERALIZED ANXIETY DISORDER): ICD-10-CM

## 2022-07-19 RX ORDER — CITALOPRAM HYDROBROMIDE 20 MG/1
20 TABLET ORAL DAILY
Qty: 90 TABLET | Refills: 1 | Status: SHIPPED | OUTPATIENT
Start: 2022-07-19

## 2022-07-19 NOTE — TELEPHONE ENCOUNTER
"Last Written Prescription Date:  11/22/21  Last Fill Quantity: 90,  # refills: 3   Last office visit provider:  3/23/22     Requested Prescriptions   Pending Prescriptions Disp Refills     citalopram (CELEXA) 20 MG tablet 90 tablet 3     Sig: Take 1 tablet (20 mg) by mouth daily       SSRIs Protocol Passed - 7/19/2022  3:56 PM        Passed - Recent (12 mo) or future (30 days) visit within the authorizing provider's specialty     Patient has had an office visit with the authorizing provider or a provider within the authorizing providers department within the previous 12 mos or has a future within next 30 days. See \"Patient Info\" tab in inbasket, or \"Choose Columns\" in Meds & Orders section of the refill encounter.              Passed - Medication is active on med list        Passed - Patient is age 18 or older        Passed - No active pregnancy on record        Passed - No positive pregnancy test in last 12 months             Jannette Mello RN 07/19/22 6:47 PM  "

## 2022-07-23 ENCOUNTER — HEALTH MAINTENANCE LETTER (OUTPATIENT)
Age: 27
End: 2022-07-23

## 2022-10-01 ENCOUNTER — HEALTH MAINTENANCE LETTER (OUTPATIENT)
Age: 27
End: 2022-10-01

## 2022-11-14 DIAGNOSIS — F41.1 GAD (GENERALIZED ANXIETY DISORDER): ICD-10-CM

## 2022-11-15 NOTE — TELEPHONE ENCOUNTER
"Routing refill request to provider for review/approval because:  Early refill requested.    Last Written Prescription Date:  7/19/22  Last Fill Quantity: 90,  # refills: 1   Last office visit provider:  3/23/22     Requested Prescriptions   Pending Prescriptions Disp Refills     citalopram (CELEXA) 20 MG tablet 90 tablet 1     Sig: Take 1 tablet (20 mg) by mouth daily       SSRIs Protocol Failed - 11/15/2022  3:11 PM        Failed - Recent (12 mo) or future (30 days) visit within the authorizing provider's specialty     Patient has had an office visit with the authorizing provider or a provider within the authorizing providers department within the previous 12 mos or has a future within next 30 days. See \"Patient Info\" tab in inbasket, or \"Choose Columns\" in Meds & Orders section of the refill encounter.              Passed - Medication is active on med list        Passed - Patient is age 18 or older        Passed - No active pregnancy on record        Passed - No positive pregnancy test in last 12 months             Michoacano Cavanaugh RN 11/15/22 3:11 PM  "

## 2022-11-16 RX ORDER — CITALOPRAM HYDROBROMIDE 20 MG/1
20 TABLET ORAL DAILY
Qty: 90 TABLET | Refills: 1 | OUTPATIENT
Start: 2022-11-16

## 2023-08-06 ENCOUNTER — HEALTH MAINTENANCE LETTER (OUTPATIENT)
Age: 28
End: 2023-08-06

## 2024-09-29 ENCOUNTER — HEALTH MAINTENANCE LETTER (OUTPATIENT)
Age: 29
End: 2024-09-29